# Patient Record
Sex: MALE | Race: WHITE | URBAN - METROPOLITAN AREA
[De-identification: names, ages, dates, MRNs, and addresses within clinical notes are randomized per-mention and may not be internally consistent; named-entity substitution may affect disease eponyms.]

---

## 2017-02-10 DIAGNOSIS — R00.2 PALPITATIONS: ICD-10-CM

## 2017-02-10 DIAGNOSIS — I10 ESSENTIAL HYPERTENSION: Primary | ICD-10-CM

## 2017-02-10 DIAGNOSIS — I10 BENIGN ESSENTIAL HYPERTENSION: Primary | ICD-10-CM

## 2017-02-10 RX ORDER — LISINOPRIL 10 MG/1
10 TABLET ORAL DAILY
Qty: 90 TABLET | Refills: 0 | Status: SHIPPED | OUTPATIENT
Start: 2017-02-10 | End: 2017-07-19 | Stop reason: DRUGHIGH

## 2017-02-10 NOTE — TELEPHONE ENCOUNTER
Medication is being filled for 1 time refill only due to:  Patient needs to be seen because it has been more than one year since last visit.

## 2017-04-17 NOTE — PROGRESS NOTES
"Ryan Elliott Markley is here for a general check up.  He is fasting. He is up to date on eye exams (contact lenses) and dental visits. Wears seat belt.--yes Wears bike helmet--NA.  Concerns today:    HCM  Adam is here for a check up. He is up to date on immunizations.   Diet: \"could be better\". Body mass index is 28.75 kg/(m^2).  He is interested in losing weight so we discussed sensible strategies.     HTN  On Lisinopril 10mg for the past 1-2 yrs. Both parents have hypertension. Asking about if weight loss will help with blood pressure. No edema. He notes occasional palpitations since teenage years. Had ECHO, normal. ? SVT. Has not had an episode since starting medication.     GERD  Ongoing symptoms x 5 yrs. No dysphagia. Takes omeprazole 20mg daily.   2 caffeine per day. Avoids eating before bedtime as it triggers symptoms..     Daily tension headache.   He gets a headache if he cuts back on caffeine. He takes buprofen 2 per day.     Dishydrotic eczema  He has generalized dr skin. He also reports intensely itchy \"bumps on the side of his fingers\". He moisturizes with Eucarin.    Seasonal allergies  He takes Zyrtec daily for allergy symptoms, mainly spring and summer. No asthma. Discussed nasal spray if needed.    Health Maintenance   Topic Date Due     INFLUENZA VACCINE (SYSTEM ASSIGNED)  09/01/2017     LIPID SCREEN Q5 YR MALE (SYSTEM ASSIGNED)  09/10/2020     TETANUS IMMUNIZATION (SYSTEM ASSIGNED)  09/22/2025         Patient Active Problem List   Diagnosis     Benign essential hypertension     Palpitations     Urinary urgency     Urinary frequency     Paroxysmal supraventricular tachycardia (H)     Hematospermia     Microscopic hematuria     Nocturia       Past Surgical History:   Procedure Laterality Date     HERNIA REPAIR, INGUINAL RT/LT  1999    left     TONSILLECTOMY  1985       Family History   Problem Relation Age of Onset     DIABETES Father      AODM     Hypertension Mother      Hypertension Father      " Hypertension Maternal Grandmother      Hypertension Maternal Grandfather      Hyperlipidemia Mother      Hyperlipidemia Father      DIABETES Maternal Grandmother      DIABETES Paternal Grandmother        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  Single, no children    HABITS:  Tob: never   ETOH: rare  Calcium: 3 per day  Caffeine: 2 cans Pepsi a day  Exercise: walking, limited time to exercise    MALE ROS  Partner: monogamous with partner (girlfriend)  ED: some intermittent dysfunction reported  Contraception: OCP  STD concerns: none  BPH: no current symptoms      Current Outpatient Prescriptions   Medication Sig Dispense Refill     lisinopril (PRINIVIL/ZESTRIL) 10 MG tablet Take 1 tablet (10 mg) by mouth daily 90 tablet 0     ciprofloxacin (CIPRO) 500 MG tablet Take 1 tablet (500 mg) by mouth 2 times daily 60 tablet 0     tamsulosin (FLOMAX) 0.4 MG 24 hr capsule Take 1 capsule (0.4 mg) by mouth daily 30 capsule 11     No Known Allergies      ROS  CONSTITUTIONAL:NEGATIVE for fever, chills, change in weight  INTEGUMENTARY/SKIN: hx of eczema, itchy bumps on sides of fingers.   EYES: NEGATIVE for vision changes or irritation  ENT/MOUTH: NEGATIVE for ear, mouth and throat problems, seasonal allergies  RESP:NEGATIVE for significant cough or SOB  CV: NEGATIVE for chest pain, palpitations, INGRAM, orthopnea, PND  or peripheral edema, hx of hypertension  GI: NEGATIVE for nausea, abdominal pain,  or change in bowel habits.   :NEGATIVE for frequency, dysuria, or hematuria  MUSCULOSKELETAL:NEGATIVE for significant arthralgias or myalgia  NEURO: NEGATIVE for weakness, dizziness or paresthesias, daily tension headache, uses ibuprofen  ENDOCRINE: NEGATIVE for polyuria/dipsia,  temperature intolerance, skin/hair changes  HEME/ALLERGY/IMMUNE: NEGATIVE for bleeding problems  PSYCHIATRIC: NEGATIVE for changes in mood or affect  Sleep: some issues with falling and staying asleep.    EXAM  BP (!) 138/92  Pulse 115  Temp 98.2  F (36.8  C)  "(Oral)  Ht 6' 3\" (190.5 cm)  Wt 230 lb (104.3 kg)  SpO2 99%  BMI 28.75 kg/m2  GENERAL APPEARANCE: Alert, pleasant, NAD  EYES: PERRL, EOMI, conjunctiva clear  HENT: TM normal bilaterally. Nose and mouth without lesions  NECK: no adenopathy, thyroid normal to palpation  RESP: lungs clear to auscultation bilaterally  Axillae: no palpable axillary masses or adenopathy  CV: regular rate and rhythm, normal S1 S2, no murmur, no carotid bruits  ABDOMEN: soft, nontender, without HSM or masses. Bowel sounds normal  : no inguinal hernias or adenopathy, no testicular masses  Rectal exam: deferred  MS: extremities normal- no gross deformities noted, no tender, hot or swollen joints.   SKIN: no suspicious lesions or rashes  NEURO: Normal strength and tone, sensory exam grossly normal, DTR normoreflexive in upper and lower extremities  PSYCH: mentation appears normal. and affect normal/bright.  EXT: no peripheral edema, pedal pulses palpable    Assessment:  (Z00.00) Annual physical exam  (primary encounter diagnosis)  Comment: healthy 38 yo male  Plan: Lipid Profile, ALT, AST, Vitamin D Deficiency,        Lipid Panel Plus ([place]),         CANCELED: Glucose        Anticipatory guidance given today regarding diet, exercise, calcium intake.  Discussed sensible approach to weight loss, goal is BMI <25, set goal to lose 4 pounds in 3 months    (I10) Benign essential hypertension  Comment: blood pressure above target range  Plan: Basic metabolic panel, lisinopril         (PRINIVIL/ZESTRIL) 20 MG tablet,         Increase to 20mg dose today. Continue efforts at diet, exercise. Return in 3 months, bring blood pressure machine to clinic    Vivian Pillai MD  Internal Medicine/Pediatrics      "

## 2017-04-18 RX ORDER — LISINOPRIL 10 MG/1
10 TABLET ORAL DAILY
Qty: 90 TABLET | Refills: 0 | Status: CANCELLED | OUTPATIENT
Start: 2017-04-18

## 2017-04-19 ENCOUNTER — OFFICE VISIT (OUTPATIENT)
Dept: FAMILY MEDICINE | Facility: CLINIC | Age: 37
End: 2017-04-19

## 2017-04-19 VITALS
WEIGHT: 230 LBS | SYSTOLIC BLOOD PRESSURE: 138 MMHG | BODY MASS INDEX: 28.6 KG/M2 | OXYGEN SATURATION: 99 % | TEMPERATURE: 98.2 F | DIASTOLIC BLOOD PRESSURE: 92 MMHG | HEART RATE: 115 BPM | HEIGHT: 75 IN

## 2017-04-19 DIAGNOSIS — Z00.00 ANNUAL PHYSICAL EXAM: Primary | ICD-10-CM

## 2017-04-19 DIAGNOSIS — I10 BENIGN ESSENTIAL HYPERTENSION: ICD-10-CM

## 2017-04-19 PROBLEM — L30.1 DYSHIDROTIC ECZEMA: Status: ACTIVE | Noted: 2017-04-19

## 2017-04-19 PROBLEM — E55.9 VITAMIN D DEFICIENCY: Status: ACTIVE | Noted: 2017-04-19

## 2017-04-19 LAB
ALT SERPL W P-5'-P-CCNC: 62 U/L (ref 0–70)
ANION GAP SERPL CALCULATED.3IONS-SCNC: 6 MMOL/L (ref 3–14)
AST SERPL W P-5'-P-CCNC: 23 U/L (ref 0–45)
BUN SERPL-MCNC: 15 MG/DL (ref 7–30)
CALCIUM SERPL-MCNC: 8.7 MG/DL (ref 8.5–10.1)
CHLORIDE SERPL-SCNC: 106 MMOL/L (ref 94–109)
CHOLEST SERPL-MCNC: 167 MG/DL
CO2 SERPL-SCNC: 26 MMOL/L (ref 20–32)
CREAT SERPL-MCNC: 1.16 MG/DL (ref 0.66–1.25)
DEPRECATED CALCIDIOL+CALCIFEROL SERPL-MC: 9 UG/L (ref 20–75)
GFR SERPL CREATININE-BSD FRML MDRD: 70 ML/MIN/1.7M2
GLUCOSE SERPL-MCNC: 86 MG/DL (ref 70–99)
HDLC SERPL-MCNC: 41 MG/DL
LDLC SERPL CALC-MCNC: 88 MG/DL
NONHDLC SERPL-MCNC: 126 MG/DL
POTASSIUM SERPL-SCNC: 3.8 MMOL/L (ref 3.4–5.3)
SODIUM SERPL-SCNC: 138 MMOL/L (ref 133–144)
TRIGL SERPL-MCNC: 187 MG/DL

## 2017-04-19 RX ORDER — MULTIPLE VITAMINS W/ MINERALS TAB 9MG-400MCG
1 TAB ORAL DAILY
COMMUNITY
End: 2017-04-19

## 2017-04-19 RX ORDER — LISINOPRIL 20 MG/1
20 TABLET ORAL DAILY
Qty: 90 TABLET | Refills: 0 | Status: SHIPPED | OUTPATIENT
Start: 2017-04-19 | End: 2017-07-19

## 2017-04-19 ASSESSMENT — PAIN SCALES - GENERAL: PAINLEVEL: NO PAIN (0)

## 2017-04-19 NOTE — PATIENT INSTRUCTIONS
CALCIUM    Recommendations:  Teenagers, men and premenopausal women: 1200 mg/day  Pregnant and Lactating women: 1500 mg/day  Postmenopausal women on estrogen: 1200mg/day  Postmenopausal women not on estrogen: 1500 mg/day    If you are not eating dairy products you also need 1000 IU of vitamin D per day which can be obtained in either a multivitamin or in some of the Calcium tablets.    Dairy sources  Milk-  cow's              8 oz            300 mg  [place] milk             8 oz            450mg  Yogurt                      6 oz            300mg  Hard cheese                     1.5 oz          300 mg  Cottage cheese                  2 cup           300 mg  Low fat dairy sources are recommended    Non Dairy sources  OJ with calcium               1 cup           300mg  Total cereal                     1 cup           1000mg    Supplements:  Tums EX                         300 mg  Tums Ultra                      400 mg  Caltrate 600                    600 mg  Oscal                           500 mg  Oscal/D                         500 mg plus vitamin D  Viactive chews                  500mg each  Women's Formula Multivitamin    450 mg    Hydrocortiosone ointment 1% for itchy bumps on fingers, eyelid  Cetaphil, CeraVe    3 month follow up   Bring in blood pressure machine    Down by at least 4 pounds!

## 2017-04-19 NOTE — MR AVS SNAPSHOT
After Visit Summary   4/19/2017    Ryan Elliott Markley    MRN: [id number]           Patient Information     Date Of Birth          1980        Visit Information        Provider Department      4/19/2017 8:00 AM Vivian Pillai MD [facility]        Today's Diagnoses     Annual physical exam    -  1    Benign essential hypertension        Palpitations          Care Instructions    CALCIUM    Recommendations:  Teenagers, men and premenopausal women: 1200 mg/day  Pregnant and Lactating women: 1500 mg/day  Postmenopausal women on estrogen: 1200mg/day  Postmenopausal women not on estrogen: 1500 mg/day    If you are not eating dairy products you also need 1000 IU of vitamin D per day which can be obtained in either a multivitamin or in some of the Calcium tablets.    Dairy sources  Milk-  cow's              8 oz            300 mg  [place] milk             8 oz            450mg  Yogurt                      6 oz            300mg  Hard cheese                     1.5 oz          300 mg  Cottage cheese                  2 cup           300 mg  Low fat dairy sources are recommended    Non Dairy sources  OJ with calcium               1 cup           300mg  Total cereal                     1 cup           1000mg    Supplements:  Tums EX                         300 mg  Tums Ultra                      400 mg  Caltrate 600                    600 mg  Oscal                           500 mg  Oscal/D                         500 mg plus vitamin D  Viactive chews                  500mg each  Women's Formula Multivitamin    450 mg    Hydrocortiosone ointment 1% for itchy bumps on fingers, eyelid  Cetaphil, CeraVe    3 month follow up   Bring in blood pressure machine    Down by at least 4 pounds!        Follow-ups after your visit        Follow-up notes from your care team     Return in about 3 months (around 7/19/2017).      Who to contact     Please call your clinic at [phone] to:    Ask questions  "about your health    Make or cancel appointments    Discuss your medicines    Learn about your test results    Speak to your doctor   If you have compliments or concerns about an experience at your clinic, or if you wish to file a complaint, please contact [facility] Patient Relations at [phone] or email us at [email or web address]         Additional Information About Your Visit        [employer] Information     [employer] gives you secure access to your electronic health record. If you see a primary care provider, you can also send messages to your care team and make appointments. If you have questions, please call your primary care clinic.  If you do not have a primary care provider, please call [phone] and they will assist you.      [employer] is an electronic gateway that provides easy, online access to your medical records. With [employer], you can request a clinic appointment, read your test results, renew a prescription or communicate with your care team.     To access your existing account, please contact your [facility] or call [phone] for assistance.        Care EveryWhere ID     This is your Care EveryWhere ID. This could be used by other organizations to access your [place] medical records  [id number]        Your Vitals Were     Pulse Temperature Height Pulse Oximetry BMI (Body Mass Index)       115 98.2  F (36.8  C) (Oral) 6' 3\" (190.5 cm) 99% 28.75 kg/m2        Blood Pressure from Last 3 Encounters:   04/19/17 (!) 138/92   10/05/15 140/87   09/22/15 121/82    Weight from Last 3 Encounters:   04/19/17 230 lb (104.3 kg)   10/05/15 213 lb 6.4 oz (96.8 kg)   09/22/15 210 lb (95.3 kg)              We Performed the Following     ALT     AST     Basic metabolic panel     Lipid Profile     Vitamin D Deficiency          Today's Medication Changes          These changes are accurate as of: 4/19/17  9:00 AM.  If you have any questions, ask " your nurse or doctor.               These medicines have changed or have updated prescriptions.        Dose/Directions    * lisinopril 10 MG tablet   Commonly known as:  PRINIVIL/ZESTRIL   This may have changed:  Another medication with the same name was added. Make sure you understand how and when to take each.   Used for:  Benign essential hypertension, Palpitations   Changed by:  Chava Killian MD        Dose:  10 mg   Take 1 tablet (10 mg) by mouth daily   Quantity:  90 tablet   Refills:  0       * lisinopril 20 MG tablet   Commonly known as:  PRINIVIL/ZESTRIL   This may have changed:  You were already taking a medication with the same name, and this prescription was added. Make sure you understand how and when to take each.   Used for:  Benign essential hypertension   Changed by:  Vivian Pillai MD        Dose:  20 mg   Take 1 tablet (20 mg) by mouth daily   Quantity:  90 tablet   Refills:  0       * Notice:  This list has 2 medication(s) that are the same as other medications prescribed for you. Read the directions carefully, and ask your doctor or other care provider to review them with you.      Stop taking these medicines if you haven't already. Please contact your care team if you have questions.     ciprofloxacin 500 MG tablet   Commonly known as:  CIPRO   Stopped by:  Vivian Pillai MD           Multi-vitamin Tabs tablet   Stopped by:  iVvian Pillai MD           tamsulosin 0.4 MG capsule   Commonly known as:  FLOMAX   Stopped by:  Vivian Pillai MD                Where to get your medicines      These medications were sent to [facility] [id number] [facility] - [place], [place] - [address]  [address], [place] [address]     Phone:  [phone]     lisinopril 20 MG tablet                Primary Care Provider Office Phone # Fax #    Ramila Cain PA-C [phone]       [address] [address]        Thank  you!     Thank you for choosing [facility]  for your care. Our goal is always to provide you with excellent care. Hearing back from our patients is one way we can continue to improve our services. Please take a few minutes to complete the written survey that you may receive in the mail after your visit with us. Thank you!             Your Updated Medication List - Protect others around you: Learn how to safely use, store and throw away your medicines at www.disposemymeds.org.          This list is accurate as of: 4/19/17  9:00 AM.  Always use your most recent med list.                   Brand Name Dispense Instructions for use    * lisinopril 10 MG tablet    PRINIVIL/ZESTRIL    90 tablet    Take 1 tablet (10 mg) by mouth daily       * lisinopril 20 MG tablet    PRINIVIL/ZESTRIL    90 tablet    Take 1 tablet (20 mg) by mouth daily       OMEPRAZOLE PO      Take by mouth as needed       ZYRTEC ALLERGY PO          * Notice:  This list has 2 medication(s) that are the same as other medications prescribed for you. Read the directions carefully, and ask your doctor or other care provider to review them with you.

## 2017-04-19 NOTE — NURSING NOTE
"    Chief Complaint   Patient presents with     Physical     37 year old      Blood pressure (!) 138/92, pulse 115, temperature 98.2  F (36.8  C), temperature source Oral, height 6' 3\" (190.5 cm), weight 230 lb (104.3 kg), SpO2 99 %. Body mass index is 28.75 kg/(m^2).    Wt Readings from Last 2 Encounters:   04/19/17 230 lb (104.3 kg)   10/05/15 213 lb 6.4 oz (96.8 kg)     BP Readings from Last 3 Encounters:   04/19/17 (!) 138/92   10/05/15 140/87   09/22/15 121/82       Patient Active Problem List   Diagnosis     Benign essential hypertension     Palpitations     Urinary urgency     Urinary frequency     Paroxysmal supraventricular tachycardia (H)     Hematospermia     Microscopic hematuria     Nocturia       Current Outpatient Prescriptions   Medication Sig Dispense Refill     Cetirizine HCl (ZYRTEC ALLERGY PO)        OMEPRAZOLE PO Take by mouth as needed       lisinopril (PRINIVIL/ZESTRIL) 10 MG tablet Take 1 tablet (10 mg) by mouth daily 90 tablet 0       Social History   Substance Use Topics     Smoking status: Never Smoker     Smokeless tobacco: Not on file     Alcohol use 1.2 oz/week     2 Standard drinks or equivalent per week         There are no preventive care reminders to display for this patient.    FRED ALVA CMA  April 19, 2017 8:08 AM    "

## 2017-07-19 DIAGNOSIS — I10 BENIGN ESSENTIAL HYPERTENSION: ICD-10-CM

## 2017-07-19 RX ORDER — LISINOPRIL 20 MG/1
20 TABLET ORAL DAILY
Qty: 90 TABLET | Refills: 0 | Status: SHIPPED | OUTPATIENT
Start: 2017-07-19

## 2017-07-19 NOTE — TELEPHONE ENCOUNTER
Medication is being filled for 1 time refill only due to:  Patient needs to be seen because needs BP followup.

## 2019-10-04 ENCOUNTER — HEALTH MAINTENANCE LETTER (OUTPATIENT)
Age: 39
End: 2019-10-04

## 2020-11-08 ENCOUNTER — HEALTH MAINTENANCE LETTER (OUTPATIENT)
Age: 40
End: 2020-11-08

## 2021-09-11 ENCOUNTER — HEALTH MAINTENANCE LETTER (OUTPATIENT)
Age: 41
End: 2021-09-11

## 2022-01-01 ENCOUNTER — HEALTH MAINTENANCE LETTER (OUTPATIENT)
Age: 42
End: 2022-01-01

## 2022-10-29 ENCOUNTER — HEALTH MAINTENANCE LETTER (OUTPATIENT)
Age: 42
End: 2022-10-29

## 2023-04-08 ENCOUNTER — HEALTH MAINTENANCE LETTER (OUTPATIENT)
Age: 43
End: 2023-04-08